# Patient Record
Sex: MALE | Race: WHITE | Employment: OTHER | URBAN - METROPOLITAN AREA
[De-identification: names, ages, dates, MRNs, and addresses within clinical notes are randomized per-mention and may not be internally consistent; named-entity substitution may affect disease eponyms.]

---

## 2017-12-13 ENCOUNTER — HOSPITAL ENCOUNTER (OUTPATIENT)
Dept: PHYSICAL THERAPY | Age: 62
Discharge: HOME | End: 2017-12-13
Payer: COMMERCIAL

## 2017-12-13 PROCEDURE — 97530 THERAPEUTIC ACTIVITIES: CPT

## 2017-12-13 PROCEDURE — 97162 PT EVAL MOD COMPLEX 30 MIN: CPT

## 2017-12-13 NOTE — PROGRESS NOTES
[address]  [facility]  [facility]  [facility] [place] [facility], [address] ,[address], [address]  Phone: [phone]  Fax: [phone] / STATEMENT OF MEDICAL NECESSITY FOR PHYSICAL THERAPY SERVICES  Patient Name: Ignacio Chambers : 1955   Medical   Diagnosis: Radiculopathy affecting upper extremity [M54.10] Treatment Diagnosis: L UE radiculopathy   Onset Date: 17     Referral Source: Edmar Souza MD [facility]): 2017   Prior Hospitalization: See medical history Provider #: [id number]   Prior Level of Function: No difficulty without home renovations   Comorbidities: Heart disease   Medications: Verified on Patient Summary List   The Plan of Care and following information is based on the information from the initial evaluation.   ===========================================================================================  Assessment / key information: Patient is a 63 y.o. male who presents to [facility] at [facility] with diagnosis of  L UE radiculopathy. Patient reports onset of L posterior shoulder and arm pain with elbow pain and numbness/tingling into L hand after painting in 17 after he painted the ceiling with his head tilted to the L. Numbness/tingling is worse when he's sitting and laying supine. He notes no relief with prednisone pack. Objective PT examination findings include (1) C/S AROM WNL, inc pain SBL and ext (2) + L UE ULTT @ wrist ext (3) slight dec  Hand parathesia with manual txn (4) hypomobile upper T/S. A home exercise program was demonstrated and provided to address the above objective and functional deficits.  Patient can benefit from skilled PT interventions to improve ROM, decrease pain/radicular SX, to facilitate performance of ADLs & improve overall functional status.   ===========================================================================================  Eval Complexity: History MEDIUM Complexity : 1-2 comorbidities / personal factors will impact the outcome/ POC ;  Examination  MEDIUM Complexity : 3 Standardized tests and measures addressing body structure, function, activity limitation and / or participation in recreation ; Presentation MEDIUM Complexity : Evolving with changing characteristics ; Decision Making MEDIUM Complexity : FOTO score of 26-74; Overall Complexity MEDIUM  Problem List: pain affecting function, decrease ROM, decrease ADL/ functional abilitiies, decrease activity tolerance and decrease flexibility/ joint mobility, FOTO score 67  Treatment Plan may include any combination of the following: Therapeutic exercise, Therapeutic activities, Neuromuscular re-education, Physical agent/modality, Gait/balance training, Manual therapy including dry needling, Aquatic therapy and Patient education  Patient / Family readiness to learn indicated by: asking questions, trying to perform skills and interest  Persons(s) to be included in education: patient (P)  Barriers to Learning/Limitations: no  Measures taken:    Patient Goal (s): \"no more pain\"   Patient self reported health status: good  Rehabilitation Potential: good   Short Term Goals: To be accomplished in  1-2  weeks:  1) Patient to be independent and compliant with initial HEP to prevent further disability. 2) Improve FOTO score to 70 indicating significant functional improvement. 3) Patient will improve C/S AROM to WNL without c/o ERP so ROM is available for ADLs and driving.  Long Term Goals: To be accomplished in  3-4  weeks:  1) Patient to be independent & compliant with progressive HEP in preparation for D/C.  2) Improve FOTO score to 75 indicating significant functional improvement in order to return to PLOF. 3) Patient to have - L UE ULTT indicating no radicular SX with ADLs.   Frequency / Duration:   Patient to be seen  2-3  times per week for 3-4  weeks:  Patient / Caregiver education and instruction: self care, activity modification and exercises  G-Codes (GP): NA  Therapist Signature: Latoya Lawson, PT, DPT, MTC, CMTPT Date: 75/69/4158   Certification Period: NA Time: 8:16 AM   ===========================================================================================  I certify that the above Physical Therapy Services are being furnished while the patient is under my care. I agree with the treatment plan and certify that this therapy is necessary. Physician Signature:        Date:       Time:     Please sign and return to [facility] or you may fax the signed copy to [phone]. Thank you.

## 2017-12-14 NOTE — PROGRESS NOTES
PHYSICAL THERAPY - DAILY TREATMENT NOTE    Patient Name: Nura Palomo        Date: 2017  : 1955   YES Patient  Verified  Visit #:     Insurance: Payor: Katelyn Benavides / [place] / Product Type: PPO /      In time: 800 Out time: 900   Total Treatment Time: 60     Medicare Time Tracking (below)   Total Timed Codes (min):   1:1 Treatment Time:       TREATMENT AREA =  Radicular pain of left upper extremity [M79.2]  SUBJECTIVE  Pain Level (on 0 to 10 scale):  3  / 10   Medication Changes/New allergies or changes in medical history, any new surgeries or procedures?     NO    If yes, update Summary List   Subjective Functional Status/Changes:  []  No changes reported     See POC          OBJECTIVE  Modalities Rationale:     decrease inflammation, decrease pain and increase tissue extensibility to improve patient's ability to perform functional ADLs   min [] Estim, type/location:                                      []  att     []  unatt     []  w/US     []  w/ice    []  w/heat    min []  Mechanical Traction: type/lbs                   []  pro   []  sup   []  int   []  cont    []  before manual    []  after manual    min []  Ultrasound, settings/location:      min []  Iontophoresis w/ dexamethasone, location:                                               []  take home patch       []  in clinic    min []  Ice     []  Heat    location/position:     min []  Vasopneumatic Device, press/temp:     min []  Other:    [] Skin assessment post-treatment (if applicable):    []  intact    []  redness- no adverse reaction     []redness  adverse reaction:         min Therapeutic Exercise:  [x]  See flow sheet   Rationale:      increase ROM and increase strength to improve the patients ability to regain full functional mobility of  for       min Manual Therapy: Technique:      [] S/DTM []IASTM []PROM [] Passive Stretching   []manual TPR  [] TDN (see objective; actual needle insertion time not billkarthik)  []Jt manipulation:Gr I [] II []  III [] IV[] V[]  Treatment/Area:     Rationale:      decrease pain, increase ROM, increase tissue extensibility and decrease trigger points to improve patient's ability to     15 min Therapeutic Activity: Postural review, lumbar roll in sitting, cervical roll sleeping, supported neutral spine/ C/S   Rationale:    increase strength, improve coordination and increase proprioception to improve the patients ability to      min Neuromuscular Re-ed:    Rationale:    improve coordination, improve balance and increase proprioception to improve the patients ability to      min Gait Training:    Rationale:       min Patient Education:  YES  Reviewed HEP   [x]  Progressed/Changed HEP based on:   Issued written HEP and reviewed     Other Objective/Functional Measures:    See POC  TE per FS     Post Treatment Pain Level (on 0 to 10) scale:   3  / 10     ASSESSMENT  Assessment/Changes in Function:     Progressed treatment as appropriate with good patient tolerance     []  See Progress Note/Recertification   Patient will continue to benefit from skilled PT services to modify and progress therapeutic interventions, address functional mobility deficits, address ROM deficits, address strength deficits, analyze and address soft tissue restrictions, analyze and cue movement patterns, analyze and modify body mechanics/ergonomics and assess and modify postural abnormalities to attain remaining goals.    Progress toward goals / Updated goals:    Progressing towards goals established in POC     PLAN  [x]  Upgrade activities as tolerated YES Continue plan of care   []  Discharge due to :    []  Other:      Therapist: Filemon Santos, PT, DPT, MTC, CMTPT    Date: 12/13/2017 Time: 7:21 PM     Future Appointments  Date Time Provider Missy Severino   12/19/2017 8:00 AM [place] [facility]   12/21/2017 8:30 AM Sil[place] White [place] [facility]   12/26/2017 8:30 AM Silvia White PT [facility]   12/28/2017 8:30 AM Sabas Perez, PT [facility]   1/2/2018 8:30 AM Dell Barnes, PT [facility] [place]   1/5/2018 8:30 AM Dell Barnes, PT [facility]

## 2017-12-19 ENCOUNTER — HOSPITAL ENCOUNTER (OUTPATIENT)
Dept: PHYSICAL THERAPY | Age: 62
Discharge: HOME | End: 2017-12-19
Payer: COMMERCIAL

## 2017-12-19 PROCEDURE — 97110 THERAPEUTIC EXERCISES: CPT

## 2017-12-19 PROCEDURE — 97140 MANUAL THERAPY 1/> REGIONS: CPT

## 2017-12-19 NOTE — PROGRESS NOTES
PHYSICAL THERAPY - DAILY TREATMENT NOTE    Patient Name: Tayla Perry        Date: 2017  : 1955   YES Patient  Verified  Visit #:      of   12  Insurance: Payor: Razia El / [place]: [place] / Product Type: PPO /      In time: 800 Out time: 850   Total Treatment Time: 45     Medicare Time Tracking (below)   Total Timed Codes (min):   1:1 Treatment Time:       TREATMENT AREA =  Radicular pain of left upper extremity [M79.2]  SUBJECTIVE  Pain Level (on 0 to 10 scale):  2  / 10   Medication Changes/New allergies or changes in medical history, any new surgeries or procedures?     NO    If yes, update Summary List   Subjective Functional Status/Changes:  []  No changes reported     The pain has been much better and I havent been waking up in the middle of the night          OBJECTIVE  Modalities Rationale:     decrease inflammation, decrease pain and increase tissue extensibility to improve patient's ability to perform functional ADLs   min [] Estim, type/location:                                      []  att     []  unatt     []  w/US     []  w/ice    []  w/heat    min []  Mechanical Traction: type/lbs                   []  pro   []  sup   []  int   []  cont    []  before manual    []  after manual    min []  Ultrasound, settings/location:      min []  Iontophoresis w/ dexamethasone, location:                                               []  take home patch       []  in clinic   PD min []  Ice     []  Heat    location/position:     min []  Vasopneumatic Device, press/temp:     min []  Other:    [] Skin assessment post-treatment (if applicable):    []  intact    []  redness- no adverse reaction     []redness  adverse reaction:        35 min Therapeutic Exercise:  [x]  See flow sheet   Rationale:      increase ROM and increase strength to improve the patients ability to regain functional mobility of C/S for pain free working    10 min Manual Therapy: Technique:      [x] S/DTM []IASTM []PROM [x] Passive Stretching   []manual TPR  [] TDN (see objective; actual needle insertion time not billed)  []Jt manipulation:Gr I [] II []  III [] IV[] V[]  Treatment/Area:  Manual txn and SOR, passive UT stretch, C/S retraction   Rationale:      decrease pain, increase ROM, increase tissue extensibility and decrease trigger points to improve patient's ability to perform household chores     min Therapeutic Activity:    Rationale:    increase strength, improve coordination and increase proprioception to improve the patients ability to      min Neuromuscular Re-ed:    Rationale:    improve coordination, improve balance and increase proprioception to improve the patients ability to      min Gait Training:    Rationale:       min Patient Education:  YES  Reviewed HEP   []  Progressed/Changed HEP based on: Other Objective/Functional Measures:    Slight dec in hand paraesthesias with MT and supine C/S retract but pt reported inc in inter scap pain   Post Treatment Pain Level (on 0 to 10) scale:   2  / 10     ASSESSMENT  Assessment/Changes in Function:     Progressed treatment as appropriate with good patient tolerance     []  See Progress Note/Recertification   Patient will continue to benefit from skilled PT services to modify and progress therapeutic interventions, address functional mobility deficits, address ROM deficits, address strength deficits, analyze and address soft tissue restrictions, analyze and cue movement patterns, analyze and modify body mechanics/ergonomics and assess and modify postural abnormalities to attain remaining goals.    Progress toward goals / Updated goals:    Progressing towards C/S ROM goals     PLAN  [x]  Upgrade activities as tolerated YES Continue plan of care   []  Discharge due to :    []  Other:      Therapist: Amairani Pérez, PT, DPT, MTC, CMTPT    Date: 12/19/2017 Time: 3:25 PM     Future Appointments  Date Time Provider Missy Severino   12/21/2017 8:30 AM Yuridia Eubanks PT [place] [address]   12/26/2017 8:30 AM Missouri Yasmany, PT [place] [address]   12/28/2017 8:30 AM [place] Yasmany, PT [place] [address]   1/2/2018 8:30 AM Lorne De La Garza, PT [place] [address]   1/5/2018 8:30 AM Lorne De La Garza, PT [place] [address]

## 2017-12-21 ENCOUNTER — HOSPITAL ENCOUNTER (OUTPATIENT)
Dept: PHYSICAL THERAPY | Age: 62
Discharge: HOME | End: 2017-12-21
Payer: COMMERCIAL

## 2017-12-21 PROCEDURE — 97140 MANUAL THERAPY 1/> REGIONS: CPT

## 2017-12-21 PROCEDURE — 97110 THERAPEUTIC EXERCISES: CPT

## 2017-12-21 NOTE — PROGRESS NOTES
PHYSICAL THERAPY - DAILY TREATMENT NOTE    Patient Name: Pari Ramirez        Date: 2017  : 1955   YES Patient  Verified  Visit #:   3   of   12  Insurance: Payor: Hussain Little / Plan: [place]PO / Product Type: PPO /      In time: 8:30 A Out time: 9:20 A   Total Treatment Time: 45     Medicare Time Tracking (below)   Total Timed Codes (min):  NA 1:1 Treatment Time:  NA     TREATMENT AREA =  Radicular pain of left upper extremity [M79.2]    SUBJECTIVE  Pain Level (on 0 to 10 scale):  1  / 10   Medication Changes/New allergies or changes in medical history, any new surgeries or procedures? NO    If yes, update Summary List   Subjective Functional Status/Changes:  []  No changes reported     Patient reports he has L arm pain into his elbow, but is not down onto his forearm.         OBJECTIVE  Modalities Rationale:    Decrease L UE pain with ADLs   min [] Estim, type/location:                                      []  att     []  unatt     []  w/US     []  w/ice    []  w/heat   5  NB min [x]  Mechanical Traction: type/lbs 19#                  []  pro   [x]  sup   []  int   [x]  cont    []  before manual    [x]  after manual    min []  Ultrasound, settings/location:      min []  Iontophoresis w/ dexamethasone, location:                                               []  take home patch       []  in clinic    min []  Ice     []  Heat    location/position:     min []  Vasopneumatic Device, press/temp:     min []  Other:    [] Skin assessment post-treatment (if applicable):    []  intact    []  redness- no adverse reaction     []redness  adverse reaction:        30 min Therapeutic Exercise:  [x]  See flow sheet   Rationale:      increase ROM and increase strength to improve the patients ability to return to  Recreational program for fitness     10 min Manual Therapy: Manual c/s txn, traction, retraction/ext with OP in supine   Rationale:      increase ROM to improve patient's ability to return to pain-free use of L UE with lifting     min Therapeutic Activity:    Rationale:      min Neuromuscular Re-ed:    Rationale:        min Gait Training:    Rationale:       min Patient Education:  YES  Reviewed HEP   []  Progressed/Changed HEP based on: Other Objective/Functional Measures: Added   RRIS with pt OP  RREIS with pt OP      Post Treatment Pain Level (on 0 to 10) scale:   2  / 10     ASSESSMENT  Assessment/Changes in Function:   trialed mechanical c/s txn, pt unable to tolerate position in supinelying, despite elevating head on raised plinth     []  See Progress Note/Recertification   Patient will continue to benefit from skilled PT services to modify and progress therapeutic interventions, address functional mobility deficits, address ROM deficits, address strength deficits and instruct in home and community integration to attain remaining goals.    Progress toward goals / Updated goals:    Progressing towards STG 2, 3     PLAN  [x]  Upgrade activities as tolerated YES Continue plan of care   []  Discharge due to :    []  Other:      Therapist: Elaine Mahoney PT    Date: 12/21/2017 Time: 9:09 AM     Future Appointments  Date Time Provider Missy Severino   12/26/2017 8:30 AM Elaine Mahoney PT [facility] [place]   12/28/2017 8:30 AM Elaine Mahoney PT [facility] [place]   1/2/2018 8:30 AM Merlin Reynolds, PT [facility] [place]   1/5/2018 8:30 AM Merlin Reynolds, PT [facility] [place]

## 2017-12-26 ENCOUNTER — HOSPITAL ENCOUNTER (OUTPATIENT)
Dept: PHYSICAL THERAPY | Age: 62
Discharge: HOME | End: 2017-12-26
Payer: COMMERCIAL

## 2017-12-26 PROCEDURE — 97110 THERAPEUTIC EXERCISES: CPT

## 2017-12-26 PROCEDURE — 97140 MANUAL THERAPY 1/> REGIONS: CPT

## 2017-12-28 ENCOUNTER — HOSPITAL ENCOUNTER (OUTPATIENT)
Dept: PHYSICAL THERAPY | Age: 62
Discharge: HOME | End: 2017-12-28
Payer: COMMERCIAL

## 2017-12-28 PROCEDURE — 97110 THERAPEUTIC EXERCISES: CPT

## 2017-12-28 PROCEDURE — 97530 THERAPEUTIC ACTIVITIES: CPT

## 2017-12-28 NOTE — PROGRESS NOTES
PHYSICAL THERAPY - DAILY TREATMENT NOTE    Patient Name: Eladia Angeles        Date: 2017  : 1955   YES Patient  Verified  Visit #:      of     Insurance: Payor: Carol Ram / Plan: [place] PPO / Product Type: PPO /      In time: 8:35 A Out time: 9A   Total Treatment Time: 25     Medicare Time Tracking (below)   Total Timed Codes (min):  NA 1:1 Treatment Time:  NA     TREATMENT AREA =  Radicular pain of left upper extremity [M79.2]    SUBJECTIVE  Pain Level (on 0 to 10 scale): 0  / 10   Medication Changes/New allergies or changes in medical history, any new surgeries or procedures?     NO    If yes, update Summary List   Subjective Functional Status/Changes:  []  No changes reported     See DC summary          OBJECTIVE  Modalities Rationale:  PD   min [] Estim, type/location:                                      []  att     []  unatt     []  w/US     []  w/ice    []  w/heat    min []  Mechanical Traction: type/lbs                   []  pro   []  sup   []  int   []  cont    []  before manual    []  after manual    min []  Ultrasound, settings/location:      min []  Iontophoresis w/ dexamethasone, location:                                               []  take home patch       []  in clinic    min []  Ice     []  Heat    location/position:     min []  Vasopneumatic Device, press/temp:     min []  Other:    [] Skin assessment post-treatment (if applicable):    []  intact    []  redness- no adverse reaction     []redness  adverse reaction:        10 min Therapeutic Exercise:  [x]  See flow sheet   Rationale:      increase ROM and increase strength to improve the patients ability to return to overhead lifting      min Manual Therapy:    Rationale:         15 min Therapeutic Activity: Reviewed progressive HEP as well as return to PLOF for fitness program    Rationale:         min Neuromuscular Re-ed:    Rationale:       min Gait Training:    Rationale:       min Patient Education:  YES  Reviewed HEP []  Progressed/Changed HEP based on: Other Objective/Functional Measures:    FOTO 98 points  (-) L ULTT  C/s AROM full all planes of motion      Post Treatment Pain Level (on 0 to 10) scale:   0  / 10     ASSESSMENT  Assessment/Changes in Function:     Pt indep with program, feels ready for DC      []  See Progress Note/Recertification   Patient will continue to benefit from skilled PT services to instruct in home and community integration to attain remaining goals. Progress toward goals / Updated goals: All goals met     PLAN  []  Upgrade activities as tolerated  Continue plan of care   [x]  Discharge due to :  All goals met   []  Other:      Therapist: William Sloan PT    Date: 12/28/2017 Time: 8:51 AM     Future Appointments  Date Time Provider Missy Severino   1/2/2018 8:30 AM William Sloan, PT [facility] [place] [place]   1/5/2018 8:30 AM Polly Singer, PT [facility] [place] [place]

## 2017-12-28 NOTE — PROGRESS NOTES
[address]  [facility]  [facility]  [facility] [place] [facility], [address] ,[address], [address]  Phone: [phone]  Fax: [phone] SUMMARY  Patient Name: Janice Flores : 1955   Treatment/Medical Diagnosis: Radicular pain of left upper extremity [M79.2]   Referral Source: Angie Galvez MD     Date of Initial Visit: 17 Attended Visits: 5 Missed Visits: 0     SUMMARY OF TREATMENT  Therapeutic exercise including ROM, stretching, gentle strengthening, neural mobilizations, postural ed, patient education, HEP instruction, MHP. CURRENT STATUS  Mr. Tory Amos has made good progress with PT & reports generally no c/o pain in c/s or L UE with primary c/o discomfort in L scapular region. He reports 85-90% improvement in overall function since initiation of PT, is pleased with his progress & feels ready for DC in order to progress. Please see below for other improvements of PT. Goal/Measure of Progress Goal Met? 1. Improve FOTO score to 70 indicating significant functional improvement. Status at last Eval: 67 Current Status: 98 yes   2. Patient will improve C/S AROM to WNL without c/o ERP so ROM is available for ADLs and driving. Status at last Eval: Pain with L SB Current Status: All c/s planes of motion pain-free & full yes   3. Patient to have - L UE ULTT indicating no radicular SX with ADLs. Status at last Eval: (+) Current Status: (-) ULTT on L UE yes   4. Patient to be independent & compliant with progressive HEP in preparation for D/C. Status at last Eval: NA Current Status: indep with HEP yes     RECOMMENDATIONS  Discontinue therapy. Progressing towards or have reached established goals. If you have any questions/comments please contact us directly at [phone]. Thank you for allowing us to assist in the care of your patient.     Therapist Signature: ELLIE Joyner, cert MDT Date: 48     Time: 9:12 AM

## 2018-01-02 ENCOUNTER — APPOINTMENT (OUTPATIENT)
Dept: PHYSICAL THERAPY | Age: 63
End: 2018-01-02

## 2018-01-05 ENCOUNTER — APPOINTMENT (OUTPATIENT)
Dept: PHYSICAL THERAPY | Age: 63
End: 2018-01-05
